# Patient Record
Sex: FEMALE | Race: WHITE | NOT HISPANIC OR LATINO | Employment: PART TIME | URBAN - METROPOLITAN AREA
[De-identification: names, ages, dates, MRNs, and addresses within clinical notes are randomized per-mention and may not be internally consistent; named-entity substitution may affect disease eponyms.]

---

## 2019-06-13 ENCOUNTER — OFFICE VISIT (OUTPATIENT)
Dept: MEDICAL GROUP | Facility: PHYSICIAN GROUP | Age: 19
End: 2019-06-13
Payer: COMMERCIAL

## 2019-06-13 VITALS
WEIGHT: 148.9 LBS | HEART RATE: 88 BPM | OXYGEN SATURATION: 97 % | BODY MASS INDEX: 28.11 KG/M2 | HEIGHT: 61 IN | TEMPERATURE: 97.5 F | DIASTOLIC BLOOD PRESSURE: 68 MMHG | RESPIRATION RATE: 18 BRPM | SYSTOLIC BLOOD PRESSURE: 116 MMHG

## 2019-06-13 DIAGNOSIS — Z00.00 WELLNESS EXAMINATION: ICD-10-CM

## 2019-06-13 PROCEDURE — 99385 PREV VISIT NEW AGE 18-39: CPT | Performed by: FAMILY MEDICINE

## 2019-06-13 ASSESSMENT — PATIENT HEALTH QUESTIONNAIRE - PHQ9: CLINICAL INTERPRETATION OF PHQ2 SCORE: 0

## 2019-06-13 NOTE — ASSESSMENT & PLAN NOTE
"She is doing well and denies any current concerns.  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  She reports her diet is good, she does not drink sweetened beverages, eats fruit and vegetables and some dairy.  She does report her diet is \"heavy in meat.\"    She does not have a current sexual partner  She is up to date with vaccines.  She has had cholesterol checked in the past and it was normal.  "

## 2019-06-13 NOTE — PROGRESS NOTES
"CC:  Well visit    HISTORY OF THE PRESENT ILLNESS: Patient is a 18 y.o. female. This pleasant patient is here today to establish care with a new provider    Health Maintenance: Completed      Wellness examination  She is doing well and denies any current concerns.  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  She reports her diet is good, she does not drink sweetened beverages, eats fruit and vegetables and some dairy.  She does report her diet is \"heavy in meat.\"    She does not have a current sexual partner  She is up to date with vaccines.      Allergies: Pcn [penicillins]    Current Outpatient Prescriptions Ordered in [facility]   Medication Sig Dispense Refill   • Fluticasone Propionate (FLONASE ALLERGY RELIEF NA) Spray  in nose.       No current [facility]-ordered facility-administered medications on file.        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     - HEENT: Negative for headaches, vision changes, hearing changes, ear pain, ear discharge, rhinorrhea, sinus congestion, sore throat, and neck pain.      - Respiratory: " "Negative for cough, sputum production, chest congestion, dyspnea, wheezing, and crackles.      - Cardiovascular: Negative for chest pain, palpitations, orthopnea, and bilateral lower extremity edema.     - Gastrointestinal: Negative for heartburn, nausea, vomiting, abdominal pain, hematochezia, melena, diarrhea, constipation, and greasy/foul-smelling stools.     - Genitourinary: Negative for dysuria, polyuria, hematuria, pyuria, urinary urgency, and urinary incontinence.    - Musculoskeletal: Negative for myalgias, back pain, and joint pain.     - Skin: Negative for rash, itching, cyanotic skin color change.     - Neurological: Negative for dizziness, tingling, tremors, focal sensory deficit, focal weakness and headaches.     - Endo/Heme/Allergies: Does not bruise/bleed easily.     - Psychiatric/Behavioral: Negative for depression, suicidal/homicidal ideation and memory loss.        Exam: /68 (BP Location: Right arm, Patient Position: Sitting, BP Cuff Size: Adult)   Pulse 88   Temp 36.4 °C (97.5 °F) (Temporal)   Resp 18   Ht 1.555 m (5' 1.22\")   Wt 67.5 kg (148 lb 14.4 oz)   SpO2 97%  Body mass index is 27.93 kg/m².    General: Normal appearing. No distress.  HEENT: Normocephalic. Eyes conjunctiva clear lids without ptosis, pupils equal and reactive to light accommodation, ears normal shape and contour, canals are clear bilaterally, tympanic membranes are benign, nasal mucosa benign, oropharynx is without erythema, edema or exudates.   Neck: Supple without JVD or bruit. Thyroid is not enlarged.  Pulmonary: Clear to ausculation.  Normal effort. No rales, ronchi, or wheezing.  Cardiovascular: Regular rate and rhythm without murmur. Carotid and radial pulses are intact and equal bilaterally.  Abdomen: Soft, nontender, nondistended. Normal bowel sounds. Liver and spleen are not palpable  Neurologic: Grossly nonfocal  Lymph: No cervical, supraclavicular or axillary lymph nodes are palpable  Skin: Warm and " dry.  No obvious lesions.  Musculoskeletal: Normal gait. No extremity cyanosis, clubbing, or edema.  Psych: Normal mood and affect. Alert and oriented x3. Judgment and insight is normal.        Assessment/Plan  1. Wellness examination  A wellness examination was conducted today.  The following risk factors were identified: weight above goal.  Routine screening was discussed and ordered as listed below.  Recommendations for exercise and healthy diet were discussed.    We reviewed all recommended screening tests and there are no indications for screening at this time.  Will defer labs as she has been screened for familial hypercholesterolemia in the past and is not at risk of sexually transmitted infections.

## 2019-09-26 ENCOUNTER — OFFICE VISIT (OUTPATIENT)
Dept: MEDICAL GROUP | Facility: PHYSICIAN GROUP | Age: 19
End: 2019-09-26
Payer: COMMERCIAL

## 2019-09-26 VITALS
DIASTOLIC BLOOD PRESSURE: 66 MMHG | BODY MASS INDEX: 28.7 KG/M2 | RESPIRATION RATE: 14 BRPM | OXYGEN SATURATION: 99 % | TEMPERATURE: 99.1 F | HEIGHT: 61 IN | SYSTOLIC BLOOD PRESSURE: 102 MMHG | HEART RATE: 100 BPM | WEIGHT: 152 LBS

## 2019-09-26 DIAGNOSIS — N92.6 IRREGULAR MENSTRUAL BLEEDING: ICD-10-CM

## 2019-09-26 PROCEDURE — 99213 OFFICE O/P EST LOW 20 MIN: CPT | Performed by: FAMILY MEDICINE

## 2019-09-27 NOTE — ASSESSMENT & PLAN NOTE
She describes a 40-45 day cycle with some light spotting in the middle of her period.  Periods themselves are heavy and painful and last about 7 days and changes pads 3 times a day.  Cramps last 4-5 days. Pamprin helps some.    She has not been sexually active.  She does have some mild symptoms.    Menarche was age 9, she was on the pill from age [age].  She does have some facial hair growth on her lip and some chin acne.

## 2019-10-08 NOTE — PROGRESS NOTES
CC: irregular periods    HISTORY OF PRESENT ILLNESS: Patient is a 18 y.o. female established patient who presents today to discuss the following new concern    Health Maintenance: Completed    Irregular menstrual bleeding  She describes a 40-45 day cycle with some light spotting in the middle of her period.  Periods themselves are heavy and painful and last about 7 days and changes pads 3 times a day.  Cramps last 4-5 days. Pamprin helps some.    She has not been sexually active.  She does have some mild symptoms.    Menarche was age 9, she was on the pill from age [age].  She does have some facial hair growth on her lip and some chin acne.        Patient Active Problem List    Diagnosis Date Noted   • Irregular menstrual bleeding 09/26/2019   • Wellness examination 06/13/2019      Allergies:Pcn [penicillins]    Current Outpatient Medications   Medication Sig Dispense Refill   • Fluticasone Propionate (FLONASE ALLERGY RELIEF NA) Spray  in nose.       No current facility-administered medications for this visit.        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       - Respiratory: Negative for cough, sputum production, chest congestion, dyspnea, wheezing, and crackles.      - Cardiovascular: Negative for  "chest pain, palpitations, orthopnea, and bilateral lower extremity edema.     - Gastrointestinal: Negative for heartburn, nausea, vomiting, abdominal pain, hematochezia, melena, diarrhea, constipation, and greasy/foul-smelling stools.     - Genitourinary: Negative for dysuria, polyuria, hematuria, pyuria, urinary urgency, and urinary incontinence.        - Psychiatric/Behavioral: Negative for depression, suicidal/homicidal ideation and memory loss.       Exam:    /66 (BP Location: Left arm, Patient Position: Sitting, BP Cuff Size: Adult)   Pulse 100   Temp 37.3 °C (99.1 °F) (Temporal)   Resp 14   Ht 1.549 m (5' 1\")   Wt 68.9 kg (152 lb)   SpO2 99%  Body mass index is 28.72 kg/m².    General:  Well nourished, well developed female in NAD  Head is grossly normal.  Neck: Supple without JVD or bruit. Thyroid is not enlarged.  Pulmonary: Clear to ausculation and percussion.  Normal effort. No rales, ronchi, or wheezing.  Cardiovascular: Regular rate and rhythm without murmur. Carotid and radial pulses are intact and equal bilaterally.  Extremities: no clubbing, cyanosis, or edema.        Assessment/Plan:  1. Irregular menstrual bleeding  Symptoms are suggestive of anovulation or hormonal imbalance.  Heavy bleeding or endometriosis is less likely given symptoms.  We will evaluate for potential causes with labs and discuss management once results are in.  - TESTOSTERONE F&T FEMALES/CHILD; Future  - TSH WITH REFLEX TO FT4; Future  - CBC WITH DIFFERENTIAL; Future  - PROGESTERONE; Future  - DHEA; Future  - FSH/LH; Future  - Comp Metabolic Panel; Future  - INSULIN FASTING; Future  - HEMOGLOBIN A1C; Future         "

## 2021-02-04 ENCOUNTER — NON-PROVIDER VISIT (OUTPATIENT)
Dept: OCCUPATIONAL MEDICINE | Facility: CLINIC | Age: 21
End: 2021-02-04

## 2021-02-04 DIAGNOSIS — Z02.1 PRE-EMPLOYMENT HEALTH SCREENING EXAMINATION: ICD-10-CM

## 2021-02-04 DIAGNOSIS — Z02.1 PRE-EMPLOYMENT DRUG SCREENING: Primary | ICD-10-CM

## 2021-02-04 LAB
AMP AMPHETAMINE: NORMAL
COC COCAINE: NORMAL
INT CON NEG: NORMAL
INT CON POS: NORMAL
MET METHAMPHETAMINES: NORMAL
OPI OPIATES: NORMAL
PCP PHENCYCLIDINE: NORMAL
POC DRUG COMMENT 753798-OCCUPATIONAL HEALTH: NEGATIVE
THC: NORMAL

## 2021-02-04 PROCEDURE — 80305 DRUG TEST PRSMV DIR OPT OBS: CPT | Performed by: NURSE PRACTITIONER

## 2021-02-04 PROCEDURE — 86580 TB INTRADERMAL TEST: CPT | Performed by: NURSE PRACTITIONER

## 2021-02-07 ENCOUNTER — NON-PROVIDER VISIT (OUTPATIENT)
Dept: URGENT CARE | Facility: CLINIC | Age: 21
End: 2021-02-07

## 2021-02-07 LAB — TB WHEAL 3D P 5 TU DIAM: 0 MM

## 2021-09-09 ENCOUNTER — HOSPITAL ENCOUNTER (OUTPATIENT)
Facility: MEDICAL CENTER | Age: 21
End: 2021-09-09
Attending: NURSE PRACTITIONER
Payer: COMMERCIAL

## 2021-09-09 PROCEDURE — U0005 INFEC AGEN DETEC AMPLI PROBE: HCPCS

## 2021-09-09 PROCEDURE — U0003 INFECTIOUS AGENT DETECTION BY NUCLEIC ACID (DNA OR RNA); SEVERE ACUTE RESPIRATORY SYNDROME CORONAVIRUS 2 (SARS-COV-2) (CORONAVIRUS DISEASE [COVID-19]), AMPLIFIED PROBE TECHNIQUE, MAKING USE OF HIGH THROUGHPUT TECHNOLOGIES AS DESCRIBED BY CMS-2020-01-R: HCPCS

## 2021-09-11 LAB
COVID ORDER STATUS COVID19: NORMAL
SARS-COV-2 RNA RESP QL NAA+PROBE: DETECTED
SPECIMEN SOURCE: ABNORMAL